# Patient Record
(demographics unavailable — no encounter records)

---

## 2024-12-23 NOTE — PHYSICAL EXAM
[de-identified] : The patient appears well nourished  and in no apparent distress.  The patient is alert and oriented to person, place, and time.   Affect and mood appear normal.    The head is normocephalic and atraumatic.  The eyes reveal normal sclera and extra ocular muscles are intact.   The neck appears normal with no jugular venous distention or masses noted.   Skin shows normal turgor with no evidence of eczema or psoriasis.  No respiratory distress noted.  The patient ambulates with a normal gait.  The right hand has full range of motion of the digits.  There is diffuse soft tissue swelling about the dorsum of the hand.  Problems flexing the hand into the palm completely are noted.  No significant warmth or erythema.  No ecchymosis.  No significant tenderness to palpation noted.  Flexor and extensor tendons are intact.   Strength and sensation to the digits is normal.  Pulses are intact.  Capillary refill is normal.   There is no clubbing, cyanosis, or edema noted.    There is no lympadenopathy of the extremity.  [de-identified] : AP, lateral, and oblique views of the right hand were obtained.  The x-rays show that the joint spaces are well maintained without arthritic changes.  There is no evidence of fracture or dislocation.

## 2024-12-23 NOTE — REASON FOR VISIT
[Follow-Up Visit] : a follow-up visit for [FreeTextEntry2] : Patient presents for evaluation of right hand swelling and right index finger trigger finger.

## 2024-12-23 NOTE — HISTORY OF PRESENT ILLNESS
[de-identified] : This patient presents today with complaints of diffuse swelling about the hand and wrist on the right side.  He notes no evidence of any injury.  The started 3 to 4 days ago.  He is having problems making a fist secondary to some stiffness as well.  Denies fever or chills.  Denies any injury to the hand or any puncture wounds.  Pain level 8 out of 10.  Patient presents for evaluation regarding his injury.

## 2024-12-23 NOTE — DISCUSSION/SUMMARY
[de-identified] : This patient presents today with swelling about the right hand of unknown etiology.  He has had the swelling in the past.  He may have inadvertently hit his hand or wrist against something during sleep.  He has some benign swelling without evidence of any infection or fracture.  I recommended reduced activities and a course of meloxicam 15 mg each day for 2 weeks.  If symptoms do not improve after meloxicam and 2 weeks I would see him back in the office for follow-up and reevaluation.  I recommended a course of Mobic, 15mg per day for the next 2 weeks.  The patient was given a prescription for the Mobic with directions to take it once daily with the first meal of the day.  They were instructed to stop the medicine and call the office if there are any adverse reactions to the medicine.  At least 30 minutes was spent performing the evaluation and management on today's office visit.  This includes but is not limited to preparing to see patient including review of any test results or outside medical records, obtaining and/or reviewing separately obtained history, performing examination and evaluation, counseling and educating the patient on their diagnosis and treatment recommendations, ordering medications, tests, or procedures, documenting clinical information in the electronic health record, independently interpreting results (not separately reported) and communicating results to the patient, and coordination of care.